# Patient Record
Sex: FEMALE | Race: WHITE | URBAN - METROPOLITAN AREA
[De-identification: names, ages, dates, MRNs, and addresses within clinical notes are randomized per-mention and may not be internally consistent; named-entity substitution may affect disease eponyms.]

---

## 2017-09-11 ENCOUNTER — OFFICE VISIT (OUTPATIENT)
Dept: FAMILY MEDICINE CLINIC | Age: 27
End: 2017-09-11

## 2017-09-11 VITALS — BODY MASS INDEX: 20.09 KG/M2 | HEIGHT: 67 IN | WEIGHT: 128 LBS

## 2017-09-11 DIAGNOSIS — Z77.120 MOLD SUSPECTED EXPOSURE: ICD-10-CM

## 2017-09-11 DIAGNOSIS — R07.0 THROAT BURNING: Primary | ICD-10-CM

## 2017-09-11 RX ORDER — HYDROXYZINE PAMOATE 25 MG/1
25 CAPSULE ORAL
Qty: 30 CAP | Refills: 1 | Status: SHIPPED | OUTPATIENT
Start: 2017-09-11 | End: 2017-09-25

## 2017-09-11 RX ORDER — CETIRIZINE HYDROCHLORIDE 5 MG/1
5 TABLET ORAL DAILY
Qty: 30 TAB | Refills: 3 | Status: SHIPPED | OUTPATIENT
Start: 2017-09-11

## 2017-09-11 RX ORDER — MOMETASONE FUROATE 50 UG/1
2 SPRAY, METERED NASAL DAILY
Qty: 1 CONTAINER | Refills: 2 | Status: SHIPPED | OUTPATIENT
Start: 2017-09-11

## 2017-09-11 RX ORDER — ALBUTEROL SULFATE 90 UG/1
AEROSOL, METERED RESPIRATORY (INHALATION)
COMMUNITY

## 2017-09-11 RX ORDER — PREDNISONE 5 MG/1
TABLET ORAL
Qty: 21 TAB | Refills: 0 | Status: SHIPPED | OUTPATIENT
Start: 2017-09-11

## 2017-09-11 NOTE — LETTER
NOTIFICATION RETURN TO WORK / SCHOOL 
 
9/11/2017 3:18 PM 
 
Ms. Domitila Russell [facility] 
[address] To Whom It May Concern: 
 
Domitila Russell is currently under the care of [address]. She will return to work/school on: return to school tomorrow 9/12, if still symptomatic pt will return after follow up with Allergist.  
 
If there are questions or concerns please have the patient contact our office.  
 
 
 
Sincerely, 
 
 
Nika Monsalve MD

## 2017-09-11 NOTE — Clinical Note
Hey  
I need a favor. Would you mind printing this letter  for me and give  this to the patient? The patient will be there to pick it up at 4:30pm.  
 
Thanks so much

## 2017-09-11 NOTE — LETTER
NOTIFICATION RETURN TO WORK  
 
9/12/2017 3:41 PM 
 
Ms. Marisabel Cabezas [address] To Whom It May Concern: 
 
Marisabel Cabezas is currently under the care of [address] [place] [address]. She will not return to work pending reevaluation and results from the [address] on 9/20/17. Pt has been treated and severe symptoms continue to persist due to constant exposure. This is of great concern, considering that continued exposure could lead to serious ,long term health issues. It is my recommendation that patient not continue to work in this environment. If there are questions or concerns please have the patient contact our office.  
 
 
 
Sincerely, 
 
 
Pricila Marrero MD

## 2017-09-11 NOTE — MR AVS SNAPSHOT
Visit Information Date & Time Provider Department Dept. Phone Encounter #  
 9/11/2017  2:00 PM Albertina Brennan, [facility] [phone] [id number] Follow-up Instructions Return in about 2 weeks (around 9/25/2017) for for symptoms . Upcoming Health Maintenance Date Due DTaP/Tdap/Td series (1 - Tdap) 6/7/2010 PAP AKA CERVICAL CYTOLOGY 6/7/2010 INFLUENZA AGE 9 TO ADULT 8/1/2017 Allergies as of 9/11/2017  Review Complete On: 9/11/2017 By: Ayo Hollis LPN No Known Allergies Current Immunizations  Never Reviewed No immunizations on file. Not reviewed this visit You Were Diagnosed With   
  
 Codes Comments Throat burning    -  Primary ICD-10-CM: R07.0 ICD-9-CM: 784.1 Mold suspected exposure     ICD-10-CM: Z77.120 ICD-9-CM: V87.31 Vitals Height(growth percentile) Weight(growth percentile) LMP BMI Smoking Status 5' 7\" (1.702 m) 128 lb (58.1 kg) 09/08/2017 20.05 kg/m2 Never Smoker Vitals History BMI and BSA Data Body Mass Index Body Surface Area 20.05 kg/m 2 1.66 m 2 Preferred Pharmacy Pharmacy Name Phone [address] [facility] [address] AT [facility] [phone] Your Updated Medication List  
  
   
This list is accurate as of: 9/11/17  4:09 PM.  Always use your most recent med list.  
  
  
  
  
 cetirizine 5 mg tablet Commonly known as:  ZYRTEC Take 1 Tab by mouth daily. hydrOXYzine pamoate 25 mg capsule Commonly known as:  VISTARIL Take 1 Cap by mouth three (3) times daily as needed for Itching for up to 14 days. mometasone 50 mcg/actuation nasal spray Commonly known as:  NASONEX  
2 Sprays by Both Nostrils route daily. predniSONE 5 mg dose pack Commonly known as:  STERAPRED See administration instruction per 5mg dose pack PROAIR HFA 90 mcg/actuation inhaler Generic drug:  albuterol Take  by inhalation. Prescriptions Sent to Pharmacy Refills  
 cetirizine (ZYRTEC) 5 mg tablet 3 Sig: Take 1 Tab by mouth daily. Class: Normal  
 Pharmacy: [address] #: [phone] Route: Oral  
 mometasone (NASONEX) 50 mcg/actuation nasal spray 2 Si Sprays by Both Nostrils route daily. Class: Normal  
 Pharmacy: [place] [address] Ph #: [phone] Route: Both Nostrils  
 hydrOXYzine pamoate (VISTARIL) 25 mg capsule 1 Sig: Take 1 Cap by mouth three (3) times daily as needed for Itching for up to 14 days. Class: Normal  
 Pharmacy: [place] [address] Ph #: [phone] Route: Oral  
 predniSONE (STERAPRED) 5 mg dose pack 0 Sig: See administration instruction per 5mg dose pack Class: Normal  
 Pharmacy: [address] #: [phone] Follow-up Instructions Return in about 2 weeks (around 2017) for for symptoms . To-Do List   
 2017 Imaging:  XR CHEST PA LAT   
  
 2017 4:10 PM  
  Appointment with [facility] RAD XR RM 1 at [facility] ([phone]) Introducing [facility] SERVICES! [facility] introduces Qapa patient portal. Now you can access parts of your medical record, email your doctor's office, and request medication refills online. 1. In your internet browser, go to [email or web address] 2. Click on the First Time User? Click Here link in the Sign In box. You will see the New Member Sign Up page. 3. Enter your Qapa Access Code exactly as it appears below. You will not need to use this code after youve completed the sign-up process.  If you do not sign up before the expiration date, you must request a new code. · BitGo Access Code: [email or web address] Expires: 12/10/2017  4:08 PM 
 
4. Enter the last four digits of your Social Security Number (xxxx) and Date of Birth (mm/dd/yyyy) as indicated and click Submit. You will be taken to the next sign-up page. 5. Create a BitGo ID. This will be your BitGo login ID and cannot be changed, so think of one that is secure and easy to remember. 6. Create a BitGo password. You can change your password at any time. 7. Enter your Password Reset Question and Answer. This can be used at a later time if you forget your password. 8. Enter your e-mail address. You will receive e-mail notification when new information is available in [address] 9. Click Sign Up. You can now view and download portions of your medical record. 10. Click the Download Summary menu link to download a portable copy of your medical information. If you have questions, please visit the Frequently Asked Questions section of the BitGo website. Remember, BitGo is NOT to be used for urgent needs. For medical emergencies, dial 911. Now available from your iPhone and Android! Please provide this summary of care documentation to your next provider. If you have any questions after today's visit, please call [phone].

## 2017-09-11 NOTE — PROGRESS NOTES
[address] [place] [address] Residency Program,    Tonya Bianchi 303 with [facility]     CC: \" I am having breathing issues when I am exposed to my classroom\"     Carmencita Erazo is a 28 y.o. 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 At this time, pt was setting up her classroom, noticed mouse feces and a humid environment but remained asymptomatic . Approximately 2-4 hours after leaving her classroom after being there for 8 hours pt began to noticed a burning sensation in her throat and lungs, along with associated dizziness and disorientation. Pt continued to have these attacks after decreasing the amount of exposure to her classroom. Pt reports 4 days ago due to feeling faint and lightheaded. She went to [facility] and was given albuterol inhaler for suspected allergy. No other interventions such as chest xray was done at that time. Pt is concerned given that school started she is unable to continue because of the persistent symptoms. Pt works from  7: 30am - 5: 00pm and continues to have intermittent attacks of dizziness, disorientation, throat burining and shortness of breath. Pt decided to come to this clinic for follow up after [facility] after continue to feel weak for the past four days. Pt has not been back to work since her visit to [facility] 4 days ago but would like to try to go to work tomorrow. Patient's last menstrual period was 09/08/2017. PMHx:  Past Medical History:   Diagnosis Date    Anemia     Ulcerative (chronic) enterocolitis (HCC)        Meds:   Current Outpatient Prescriptions   Medication Sig Dispense Refill    albuterol (PROAIR HFA) 90 mcg/actuation inhaler Take  by inhalation.       cetirizine (ZYRTEC) 5 mg tablet Take 1 Tab by mouth daily. 30 Tab 3    mometasone (NASONEX) 50 mcg/actuation nasal spray 2 Sprays by Both Nostrils route daily. 1 Container 2    hydrOXYzine pamoate (VISTARIL) 25 mg capsule Take 1 Cap by mouth three (3) times daily as needed for Itching for up to 14 days. 30 Cap 1    predniSONE (STERAPRED) 5 mg dose pack See administration instruction per 5mg dose pack 21 Tab 0       Allergies:   No Known Allergies    Smoker:  History   Smoking Status    Never Smoker   Smokeless Tobacco    Never Used       ETOH:   History   Alcohol Use No       FH:   Family History   Problem Relation Age of Onset    Arrhythmia Mother     Glaucoma Maternal Grandmother     Dementia Maternal Grandfather     Heart Disease Paternal Grandmother     Arthritis-osteo Paternal Grandmother        ROS:  Review of Systems   Constitutional: Positive for fatigue. Negative for activity change, appetite change, chills and fever. HENT: Positive for sore throat. Respiratory: Positive for chest tightness and shortness of breath. Cardiovascular: Negative for chest pain, palpitations and leg swelling. Gastrointestinal: Positive for nausea. Negative for abdominal pain, diarrhea and vomiting. Genitourinary: Negative for dysuria, flank pain and hematuria. Musculoskeletal: Negative for back pain and joint swelling. Neurological: Positive for dizziness, weakness, light-headedness and headaches. Negative for numbness. Psychiatric/Behavioral: Negative for confusion. Physical Exam:  Visit Vitals    Ht 5' 7\" (1.702 m)    Wt 128 lb (58.1 kg)    LMP 09/08/2017    BMI 20.05 kg/m2       Wt Readings from Last 3 Encounters:   09/11/17 128 lb (58.1 kg)     BP Readings from Last 3 Encounters:   No data found for BP        Physical Exam   Constitutional: She is oriented to person, place, and time. She appears well-developed and well-nourished. HENT:   Head: Atraumatic. Eyes: Conjunctivae and EOM are normal.   Neck: Neck supple. Cardiovascular: Normal rate, regular rhythm, normal heart sounds and intact distal pulses. Pulmonary/Chest: Effort normal and breath sounds normal. No respiratory distress. She has no wheezes. She has no rales. She exhibits no tenderness. Abdominal: Bowel sounds are normal.   Musculoskeletal: Normal range of motion. She exhibits no edema. Neurological: She is alert and oriented to person, place, and time. No cranial nerve deficit. Skin: Skin is warm. No erythema. Psychiatric: She has a normal mood and affect. Images   Ambulatory PA/LA Chest Xray : No acute abnomalities  All images were interpreted by me and Dr. Ronnie Lucero. Assessment     28 y.o. female presents with: There is no problem list on file for this patient. Today's diagnoses are:    ICD-10-CM ICD-9-CM    1. Throat burning R07.0 784.1 albuterol (PROAIR HFA) 90 mcg/actuation inhaler      cetirizine (ZYRTEC) 5 mg tablet      XR CHEST PA LAT      mometasone (NASONEX) 50 mcg/actuation nasal spray      predniSONE (STERAPRED) 5 mg dose pack   2. Mold suspected exposure Z77.120 V87.31 albuterol (PROAIR HFA) 90 mcg/actuation inhaler      cetirizine (ZYRTEC) 5 mg tablet      XR CHEST PA LAT      mometasone (NASONEX) 50 mcg/actuation nasal spray      hydrOXYzine pamoate (VISTARIL) 25 mg capsule      predniSONE (STERAPRED) 5 mg dose pack              Plan     1. Suspected Mold exposure  Pt with throat/lung burning, dizziness,chest tightness when exposed to classroom. Since are intermittent lasting 45mins - 5 hoursat the time of onset. Physical Exam was benign. Lungs were clear.    - Will treat symptomatically by h1,h2 blockade ( Vistaril, Zyrtec),prednisone dose pack Nasnonex, and albuterol inhaler  - Advised Purchasing  HEPA filter   - CXRAY ( PA/LA) , was normal  - Advised pt to talk to her local city officials and principle about this situation and environmental harm  - Will give note for work after missing since Thursday 9/8-9/11, pt will attempt to go to work tomorrow     5. Follow up in 2 weeks for symptoms ( throat burning, dizziness)     Prior labs and imaging were reviewed. I have discussed the diagnosis with the patient and the intended plan as seen in the above orders. The patient has received an after-visit summary and questions were answered concerning future plans. I have discussed medication side effects and warnings with the patient as well. Patient was seen and discussed  with Chuck Arreola , Attending Physician.     Stacy Whitten MD    [address] [place] [address]

## 2017-09-11 NOTE — PROGRESS NOTES
Chief Complaint   Patient presents with   [facility] ED Follow-up     pt seen at [facility] 09/07/2017

## 2017-09-12 ENCOUNTER — TELEPHONE (OUTPATIENT)
Dept: FAMILY MEDICINE CLINIC | Age: 27
End: 2017-09-12

## 2017-09-12 NOTE — TELEPHONE ENCOUNTER
----- Message from Darlene Bucio sent at 9/12/2017  3:11 PM EDT -----  Regarding: Dr. Kiko Mi / Alexsander Proctor / Telephone  Pt is requesting to speak with either Dr. Alexsander Proctor or Kiko Mi and best contact number is [phone]

## 2017-09-20 ENCOUNTER — TELEPHONE (OUTPATIENT)
Dept: FAMILY MEDICINE CLINIC | Age: 27
End: 2017-09-20

## 2017-09-20 NOTE — TELEPHONE ENCOUNTER
Pt needs Doctors note for job stating that she cannot continue working in her current building in the future. Per HR at her job the original letter makes it appear that she will return on the 20th. But she needs it to say that going forward she cannot return to that building due to her allergic reactions.  [facility] phone [phone]

## 2017-09-20 NOTE — LETTER
9/20/2017 5:27 PM 
 
Ms. Scott Boland [facility] 13 To Whom It May Concern: 
  
Scott Boland is currently under the care of [address] [place] [address]. 
  
She will not return to work due to the results from the [address] on 9/20/17. Pt has been treated,but continued exposure could lead to serious,long term health issues.  It is my recommendation that patient not continue to work in this environment in the future.  
  
If there are questions or concerns please have the patient contact our office. 
  
 
 
 
Sincerely, 
 
 
 
Mynor Lorenzo MD

## 2017-09-25 ENCOUNTER — OFFICE VISIT (OUTPATIENT)
Dept: FAMILY MEDICINE CLINIC | Age: 27
End: 2017-09-25

## 2017-09-25 VITALS
OXYGEN SATURATION: 100 % | WEIGHT: 131.6 LBS | HEART RATE: 70 BPM | SYSTOLIC BLOOD PRESSURE: 97 MMHG | RESPIRATION RATE: 18 BRPM | DIASTOLIC BLOOD PRESSURE: 64 MMHG | TEMPERATURE: 98.1 F | BODY MASS INDEX: 20.65 KG/M2 | HEIGHT: 67 IN

## 2017-09-25 DIAGNOSIS — J98.01 BRONCHOSPASM: Primary | ICD-10-CM

## 2017-09-25 DIAGNOSIS — Z91.09 ENVIRONMENTAL ALLERGIES: ICD-10-CM

## 2017-09-25 NOTE — LETTER
NOTIFICATION RETURN TO WORK / SCHOOL 
 
9/25/2017 8:32 AM 
 
Ms. Areli Bauman [address] To Whom It May Concern: 
 
Areli Bauman is currently under the care of [facility]. Patient was initially seen on 9/11/2017, for suspected mold exposure , however pt was referred to allergist. The results from the [address] on 9/20/17 conveyed that she was highly allergic to the following : mice proteins ( including urine, feces, dander), cockroaches, and dust mites. Pt has been treated,but continued exposure can cause bronchospastic symptoms to include asthma and pneumonitis. Therefore, continued exposure led to serious symptoms of difficulty breathing, throat burning, dizziness, and weakness which has long term health issues. Per medical literature, these allergens remain airborne and can continue to cause significant symptoms as mentioned above. It is my recommendation that patient not continue to work in this environment in the future.  
 
   
If there are questions or concerns please have the patient contact our office.  
 
 
 
 
 
Sincerely, 
 
 
Carrie Bonner MD

## 2017-09-25 NOTE — PROGRESS NOTES
[address] [place] [address] Residency Program,    [address] with [facility] and [facility]            HPI     CC: Im doing well    Trever Cho is a 28 y.o. female who presents for follow up after difficulty breathing , chest and throat burning, dizziness and weakness due to classroom exposure. Pt worked at [employer]. Pt reports the classroom was infested with black mold and mice feces. She was treated symptomatically with inhaler, Zyrtec, vistaril, prednisone, and Nasonex. Chest Xray was negative for acute abnormalities. Pt remained symptomatic. Now patient has been taken out of work indefinitely after being seen by allergist. She tested positive for mice protein( includes urine or dander), cockroaches, dust mites, along with tree pollen and grass. Today, pt reports since not working at [employer] for the past week ,its taken her atleast 6 days to feel back to baseline. Pt uses Flonase and zyrtec prn, but otherwise does no use any other medication. PMHx:  Past Medical History:   Diagnosis Date    Anemia     Ulcerative (chronic) enterocolitis (HCC)        Meds:   Current Outpatient Prescriptions   Medication Sig Dispense Refill    albuterol (PROAIR HFA) 90 mcg/actuation inhaler Take  by inhalation.  cetirizine (ZYRTEC) 5 mg tablet Take 1 Tab by mouth daily. 30 Tab 3    mometasone (NASONEX) 50 mcg/actuation nasal spray 2 Sprays by Both Nostrils route daily. 1 Container 2    hydrOXYzine pamoate (VISTARIL) 25 mg capsule Take 1 Cap by mouth three (3) times daily as needed for Itching for up to 14 days.  30 Cap 1    predniSONE (STERAPRED) 5 mg dose pack See administration instruction per 5mg dose pack 21 Tab 0       Allergies:   No Known Allergies    Smoker:  History   Smoking Status    Never Smoker   Smokeless Tobacco    Never Used       ETOH:   History   Alcohol Use No       FH:   Family History   Problem Relation Age of Onset    Arrhythmia Mother  Glaucoma Maternal Grandmother     Dementia Maternal Grandfather     Heart Disease Paternal Grandmother     Arthritis-osteo Paternal Grandmother        ROS:  Review of Systems   Constitutional: Negative for activity change, appetite change, chills, fatigue and fever. Respiratory: Negative for chest tightness and shortness of breath. Cardiovascular: Negative for chest pain, palpitations and leg swelling. Gastrointestinal: Negative for abdominal pain, diarrhea, nausea and vomiting. Genitourinary: Negative for dysuria, flank pain and hematuria. Musculoskeletal: Negative for back pain and joint swelling. Neurological: Negative for dizziness, weakness and numbness. Psychiatric/Behavioral: Negative for confusion. Physical Exam:  Visit Vitals    BP 97/64 (BP 1 Location: Left arm, BP Patient Position: Sitting)    Pulse 70    Temp 98.1 °F (36.7 °C) (Oral)    Resp 18    Ht 5' 7\" (1.702 m)    Wt 131 lb 9.6 oz (59.7 kg)    LMP 09/08/2017    SpO2 100%    BMI 20.61 kg/m2       Wt Readings from Last 3 Encounters:   09/25/17 131 lb 9.6 oz (59.7 kg)   09/11/17 128 lb (58.1 kg)     BP Readings from Last 3 Encounters:   09/25/17 97/64        Physical Exam   Constitutional: She is oriented to person, place, and time. She appears well-developed and well-nourished. HENT:   Head: Atraumatic. Eyes: Conjunctivae and EOM are normal.   Neck: Neck supple. Cardiovascular: Normal rate, regular rhythm, normal heart sounds and intact distal pulses. Pulmonary/Chest: Effort normal and breath sounds normal. No respiratory distress. Abdominal: Bowel sounds are normal.   Musculoskeletal: Normal range of motion. She exhibits no edema. Neurological: She is alert and oriented to person, place, and time. No cranial nerve deficit. Skin: Skin is warm. No erythema. Psychiatric: She has a normal mood and affect.             Assessment     28 y.o. female previously healthy presents with history of:  Patient Active Problem List   Diagnosis Code    Environmental allergies Z91.09       Today's diagnoses are:    ICD-10-CM ICD-9-CM    1. Bronchospasm J98.01 519.11    2. Environmental allergies Z91.09 V15.09               Plan     ·  Bronchospasm / Environmental Allergies   Pt will not return to work and been out of work for the past 1 week. Since then symptoms of throat burning, dizziness, difficulty breathing and weakness has resloved   - Flonase prn   - Zyrtec prn          Follow-up Disposition:  Return if symptoms worsen or fail to improve. Prior labs and imaging were reviewed. I have discussed the diagnosis with the patient and the intended plan as seen in the above orders. The patient has received an after-visit summary and questions were answered concerning future plans. I have discussed medication side effects and warnings with the patient as well. Patient discussed with Dr. Randy Thurman, Attending Physician.     Jesús Nugent MD    [address] [place] [address]

## 2017-09-25 NOTE — PROGRESS NOTES
Chief Complaint   Patient presents with    Follow-up     after allergy specialist appoiontment     1. Have you been to the ER, urgent care clinic since your last visit? Hospitalized since your last visit? No    2. Have you seen or consulted any other health care providers outside of the [address] [place] [address] since your last visit? Include any pap smears or colon screening.  No

## 2017-09-25 NOTE — MR AVS SNAPSHOT
Visit Information Date & Time Provider Department Dept. Phone Encounter #  
 9/25/2017  8:20 AM Joesph Marroquin, [facility] [phone] [id number] Follow-up Instructions Return if symptoms worsen or fail to improve. Upcoming Health Maintenance Date Due DTaP/Tdap/Td series (1 - Tdap) 6/7/2010 PAP AKA CERVICAL CYTOLOGY 6/7/2010 INFLUENZA AGE 9 TO ADULT 8/1/2017 Allergies as of 9/25/2017  Review Complete On: 9/25/2017 By: Daryle Gates No Known Allergies Current Immunizations  Never Reviewed No immunizations on file. Not reviewed this visit You Were Diagnosed With   
  
 Codes Comments Bronchospasm    -  Primary ICD-10-CM: J98.01 
ICD-9-CM: 519.11 Vitals BP Pulse Temp Resp Height(growth percentile) Weight(growth percentile) 97/64 (BP 1 Location: Left arm, BP Patient Position: Sitting) 70 98.1 °F (36.7 °C) (Oral) 18 5' 7\" (1.702 m) 131 lb 9.6 oz (59.7 kg) LMP SpO2 BMI Smoking Status 09/08/2017 100% 20.61 kg/m2 Never Smoker Vitals History BMI and BSA Data Body Mass Index Body Surface Area  
 20.61 kg/m 2 1.68 m 2 Preferred Pharmacy Pharmacy Name Phone [address] [facility] [address] AT [facility] [address]. [phone] Your Updated Medication List  
  
   
This list is accurate as of: 9/25/17  8:50 AM.  Always use your most recent med list.  
  
  
  
  
 cetirizine 5 mg tablet Commonly known as:  ZYRTEC Take 1 Tab by mouth daily. hydrOXYzine pamoate 25 mg capsule Commonly known as:  VISTARIL Take 1 Cap by mouth three (3) times daily as needed for Itching for up to 14 days. mometasone 50 mcg/actuation nasal spray Commonly known as:  NASONEX  
2 Sprays by Both Nostrils route daily. predniSONE 5 mg dose pack Commonly known as:  STERAPRED See administration instruction per 5mg dose pack PROAIR HFA 90 mcg/actuation inhaler Generic drug:  albuterol Take  by inhalation. Follow-up Instructions Return if symptoms worsen or fail to improve. Introducing [facility]! [facility] introduces Go[employer] patient portal. Now you can access parts of your medical record, email your doctor's office, and request medication refills online. 1. In your internet browser, go to [email or web address] 2. Click on the First Time User? Click Here link in the Sign In box. You will see the New Member Sign Up page. 3. Enter your GoMiles Access Code exactly as it appears below. You will not need to use this code after youve completed the sign-up process. If you do not sign up before the expiration date, you must request a new code. · GoMiles Access Code: [email or web address] Expires: 12/10/2017  4:08 PM 
 
4. Enter the last four digits of your Social Security Number (xxxx) and Date of Birth (mm/dd/yyyy) as indicated and click Submit. You will be taken to the next sign-up page. 5. Create a GoMiles ID. This will be your GoMiles login ID and cannot be changed, so think of one that is secure and easy to remember. 6. Create a GoMiles password. You can change your password at any time. 7. Enter your Password Reset Question and Answer. This can be used at a later time if you forget your password. 8. Enter your e-mail address. You will receive e-mail notification when new information is available in [address] 9. Click Sign Up. You can now view and download portions of your medical record. 10. Click the Download Summary menu link to download a portable copy of your medical information. If you have questions, please visit the Frequently Asked Questions section of the GoMiles website. Remember, GoMiles is NOT to be used for urgent needs. For medical emergencies, dial 911. Now available from your iPhone and Android! Please provide this summary of care documentation to your next provider. If you have any questions after today's visit, please call [phone].